# Patient Record
(demographics unavailable — no encounter records)

---

## 2020-07-27 NOTE — EMERGENCY DEPARTMENT NOTE
History of Present Illnes


History of Present Illness


Chief Complaint:  Extremity Trauma/Pain


History of Present Illness


This is a 9 year old  male with left hand and little finger pain

s/p falling onto an outstretched hand while playing basketball yesterday.


Historian:  Family Member


Arrival Mode:  Car


 Required:  No


Onset (how long ago):  day(s) (1)


Location:  left hand/little finger


Quality:  dull


Severity:  moderate


Onset quality:  sudden


Duration (how long):  day(s)


Timing of current episode:  constant


Progression:  unchanged


Chronicity:  new


Context:  Reports trauma/injury; 


   Denies recent illness, Denies recent travel


Relieving factors:  none


Exacerbating factors:  none


Associated symptoms:  Denies confusion, Denies fever/chills, Denies malaise, 

Denies nausea/vomiting, Denies shortness of breath





Past Medical/Family History


Physician Review


I have reviewed the patient's past medical and family history.  Any updates have

been documented here.





Past Medical History


Recent Fever:  No


Clinical Suspicion of Infectio:  No


New/Unexplained Change in Ment:  No


Past Medical History:  None


Past Surgical History:  None





Social History


Smoking Cessation:  Never Smoker


Alcohol Use:  None


Any Illegal Drug Use:  No


Physically hurt or threatened:  No





Other


Last Tetanus:  UTD


Any Pre-Existing Lines (PICC,:  No





Review of Systems


Review of Systems


Constitutional:  Reports no symptoms


EENTM:  Reports no symptoms


Cardiovascular:  Denies chest pain, Denies palpitations, Denies syncope


Respiratory:  Reports no symptoms


Gastrointestinal:  Reports no symptoms


Genitourinary:  Reports no symptoms


Musculoskeletal:  Reports as per HPI


Integumentary:  Reports no symptoms


Neurological:  Reports no symptoms


Psychological:  Reports no symptoms


Review of other systems:  All other systems negative





Physical Exam


Related Data


Allergies:  


Coded Allergies:  


     No Known Allergies (Unverified , 2/10/18)


Triage Vital Signs





Vital Signs








  Date Time  Temp Pulse Resp B/P (MAP) Pulse Ox O2 Delivery O2 Flow Rate FiO2


 


7/27/20 14:43 98.3 84 18 99/65 99 Room Air  











Physical Exam


CONSTITUTIONAL





Constitutional:  Present well-developed, Present well-nourished


HENT


HENT:  Present normocephalic, Present atraumatic


HENT L/R:  Present left ext ear normal, Present right ext ear normal


EYES





Eyes:  Reports conjunctivae normal; 


   Denies left eye discharge, Denies right eye discharge


NECK


Neck:  Present ROM normal, Present supple


PULMONARY


Pulmonary:  Present effort normal, Present breath sounds normal; 


   Absent respiratory distress, Absent chest tenderness


CARDIOVASCULAR





Cardiovascular:  Present regular rhythm, Present capillary refill normal


GASTROINTESTINAL





Abdominal:  Present soft


GENITOURINARY





SKIN


Skin:  Present warm


MUSCULOSKELETAL





Musculoskeletal:  Present tenderness (left little finger tenderness over prox 

phaylynx and ulnar aspect of hand.), Present other (FROM of left little finger 

DIP joint with no pain. FROM of left little finger PIP joint and MP joint, but 

with pain.); 


   Absent edema, Absent deformity


NEUROLOGICAL





Neurological:  Present alert, Present oriented x 3, Present no gross motor or 

sensory deficits; 


   Absent sensory deficit


PSYCHOLOGICAL


Psychological:  Present mood/affect normal, Present behavior normal





Results


Imaging


Imaging Comments


EXAMINATION:  HAND 3 VIEW LT - HOPD    





INDICATION: Trauma





COMPARISON: None


     


FINDINGS:





Acute buckle fracture of the medial metaphysis and minimally displaced Salter 2


fracture of the lateral metaphysis of the fifth proximal phalanx with


associated fifth digit soft tissue swelling. 





IMPRESSION: 


Acute base of fifth proximal phalanx fractures as above with associated


overlying soft tissue swelling.





Signed by: Kortney Patel MD on 7/27/2020 3:55 PM





Procedures


Orthopedic Splinting/Casting


Injury:  Injury #1


Side:  left


Upper exremity injury location:  hand


Upper extremity immobilizer:  ulnar gutter


Additional comments


N/V intact after splint placed.





Assessment & Plan


Medical Decision Making


MDM


sprain, strain, fracture





Reassessment


Reassessment time:  16:42


Reassessment


Ulnar gutter in place. N/V intact. No pain.





Assessment & Plan


Final Impression:  


(1) Phalanx, proximal fracture of finger


Last Vital Signs











  Date Time  Temp Pulse Resp B/P (MAP) Pulse Ox O2 Delivery O2 Flow Rate FiO2


 


7/27/20 14:43 98.3 84 18 99/65 99 Room Air  








Home Meds


Reported Medications


Albuterol Sulfate (ALBUTEROL SULFATE) 0.63 Mg/3 Ml Vial.neb, INH PRN for 

SHORTNESS OF BREATH


   2/10/18











IRENE ABBOTT MD              Jul 27, 2020 15:50

## 2020-07-27 NOTE — DIAGNOSTIC IMAGING REPORT
EXAMINATION:  HAND 3 VIEW LT - HOPD    



INDICATION: Trauma



COMPARISON: None

     

FINDINGS:



Acute buckle fracture of the medial metaphysis and minimally displaced Salter 2

fracture of the lateral metaphysis of the fifth proximal phalanx with

associated fifth digit soft tissue swelling. 



IMPRESSION: 

Acute base of fifth proximal phalanx fractures as above with associated

overlying soft tissue swelling.



Signed by: Kortney Patel MD on 7/27/2020 3:55 PM

## 2020-10-04 NOTE — XMS REPORT
Continuity of Care Document

                             Created on: 10/04/2020



ARTUR LOPEZ

External Reference #: [id number]

: 2011

Sex: Male



Demographics





                          Address                   [address]

[place]  [address]

 

                          Home Phone                [phone]

 

                          Preferred Language        English

 

                          Marital Status            Unknown

 

                          Yarsani Affiliation     Unknown

 

                          Race                      Unknown

 

                                        Additional Race(s) 

Afro-American



 

                          Ethnic Group              Unknown





Author





                          Author                    [facility]

t

 

                          Organization              [facility]

t

 

                          Address                   [address]

[place]  [address]



 

                          Phone                     Unavailable







Support





                Name            Relationship    Address         Phone

 

                    LUIS ALBERTO WATKINS     ECON                [address]

[place]  [address]                     Unavailable







Care Team Providers





                    Care Team Member Name Role                Phone

 

                    NONSTAFF            PCP                 Unavailable

 

                    LUIS ALBERTO CID M.D. Attphys             Unavailable

 

                    MONTANA ABBOTT             Unavailable







Payers





           Payer Name Policy Type Policy Number Effective Date Expiration Date [facility]            [id number]  2018-10-01 00:00:00 202

 00:00:00 [facility]







Problems





           Condition Name Condition Details Condition Category Status     Onset 

Date Resolution

Date            Last Treatment Date Treating Clinician Comments        Source

 

                                        Closed displaced fracture of proximal ph

alanx of left little finger, initial 

encounter                               Closed displaced fracture of proximal ph

alanx of left little finger, 

initial encounter Problem Active                                          [facility]

 

       Fracture of proximal phalanx of finger        Problem Active             

                       [facility]







Allergies, Adverse Reactions, Alerts

This patient has no known allergies or adverse reactions.



Social History





           Social Habit Start Date Stop Date  Quantity   Comments   Source

 

           Sex Assigned At Birth 2011 00:00:00 2011 00:00:00 Male   

               [facility]







Medications





             Ordered Medication Name Filled Medication Name Start Date   Stop Da

te    Current 

Medication? Ordering Clinician Indication Dosage     Frequency  Signature (SIG) 

Comments                  Components                Source

 

       Albuterol Sulfate Albuterol Sulfate               Yes                    

            as needed for Shortness Of 

Breath                                                      [facility]







Vital Signs





             Vital Name   Observation Time Observation Value Comments     Source

 

             Body Temperature 2020 17:00:00 98.6 [degF]               [facility]

 

             Weight       2020 14:43:00 131 [lb_av]               [facility]

 

             BMI (Body Mass Index) 2020 14:43:00 28.3 kg/m2               

 [facility]







Procedures

This patient has no known procedures.



Plan of Care





             Planned Activity Planned Date Details      Comments     Source

 

             Instructions              Fractures                 [facility]







Encounters





             Start Date/Time End Date/Time Encounter Type Admission Type Attendi

[facility]   Care Department Encounter ID    Source

 

             2020 13:00:00 2020 13:00:00 Appointment; CLAUDIA CID M.D. TRAVER, JESSICA, M.D. [facility]             Orthopedics - [place] 1 [id number]       

 [facility]

 

             2020 10:15:00 2020 10:15:00 Appointment; CLAUDIA CID M.D. TRAVER, JESSICA, M.D. [facility] 1 [id number]       

 [facility]

 

           2020 14:55:00 2020 17:21:00 Departed Emergency Room 1    

      IRENE ABBOTT 

[facility] [id number]        [facility]

 

          2018-08-10 02:58:00 2018-08-10 03:47:00 Departed Emergency Room       

              [facility]                    [id number]              [facility]

 

          2018-02-10 22:31:00 2018 00:00:00 Departed Emergency Room       

              [facility]                    [id number]              [facility]







Results





           Test Description Test Time  Test Comments Results    Result Comments 

Source

 

                [U] XRAY FINGER(S) - 2 VWS MIN. LEFT [id number] 2020 13:43:00  

               Images 

acquired, not reported on this accession number.                           [facility] 

[facility]

 

                HAND 3 VIEW LT - HOPD 2020 15:51:00                       

                              

                                                  [place] [address]      Patient Name: ARTUR LOPEZ                                MR #: 
[id number]                  : 2011                                   
Age/Sex: 9/M  Acct #: [id number]                              Req #: [id number]
[id number]  Adm Physician:                                                      
Ordered by: IRENE ABBOTT MD                         Report #: [id number]      
  Location: [facility]                                    Room/Bed:                   
________________________________________________________________________________

___________________    Procedure: [id number] HOPD/HAND 3 VIEW LT - HOPD  Exam 
Date: 20                            Exam Time: 1531                       
                       REPORT STATUS: Signed    EXAMINATION:  HAND 3 VIEW LT - 
HOPD          INDICATION: Trauma      COMPARISON: None           FINDINGS:      
Acute buckle fracture of the medial metaphysis and minimally displaced Salter 2 
  fracture of the lateral metaphysis of the fifth proximal phalanx with   asso
ciated fifth digit soft tissue swelling.       IMPRESSION:    Acute base of 
fifth proximal phalanx fractures as above with associated   overlying soft 
tissue swelling.      Signed by: Eddie Sanchez MD on 2020 3:55 PM        
Dictated By: EDDIE SANCHEZ MD  Electronically Signed By: EDDIE SANCHEZ MD on 20  Transcribed By: KIRBY on 20       COPY TO:   IRENE ABBOTT MD

## 2020-10-04 NOTE — EMERGENCY DEPARTMENT NOTE
History of Present Illnes


History of Present Illness


Chief Complaint:  Pediatric Illness


History of Present Illness


This is a 9 year old  male with chief complaint of fever since 

October 1, 2020. Patient initial complaint was a sore throat, a swollen "gland" 

on his left neck, and a generalized rash. He saw his pediatrician the day after 

the onset of his illness where he had a negative strep and negative flu test. He

was started on Augmentin and Hydroxyzine. The rash has resolved and the "gland" 

is decreasing in size. Mom presents today for fever that did not resolve with 

7mg/kg of APAP. He's had an occasional nonproductive cough. No SOB. No wheezing.

No dysuria or hematuria. He's had 4 episodes of vomiting. There's been no 

hematemesis. He's had diarrhea approximately once a day which she describes as w

atery. No loss of taste or smell. No sick contacts. No bad food. Patient with 

PMHx of dextrocardia and "seasonal" Asthma. Patient only has Asthma attacks in 

the fall "when the weather changes". No prior hospital admits for asthma. Has 

home neb treatments, but has not used them "in a long time".


Historian:  Patient, Family Member


Arrival Mode:  Car


 Required:  No


Onset (how long ago):  day(s) (Oct. 1, 2020)


Location:  Sore throat and swollen gland on rt


Quality:  "pain"


Radiation:  Reports non-radiation


Severity:  unable to specify


Onset quality:  unable to specify


Duration (how long):  day(s)


Timing of current episode:  constant


Progression:  improving


Chronicity:  new


Context:  Reports recent illness; 


   Denies trauma/injury


Relieving factors:  none


Exacerbating factors:  none


Associated symptoms:  Reports fever/chills, Reports loss of appetite, Reports 

malaise, Reports nausea/vomiting, Reports rash; 


   Denies confusion, Denies chest pain, Denies diaphoresis, Denies headaches, 

Denies seizure, Denies shortness of breath


Treatments prior to arrival:  antipyretic, other (antibiotic)


Previous service:  tests performed





Past Medical/Family History


Physician Review


I have reviewed the patient's past medical and family history.  Any updates have

been documented here.





Past Medical History


Recent Fever:  Yes


Clinical Suspicion of Infectio:  Yes


New/Unexplained Change in Ment:  No


Other Medical History:  


Asthma, Seasonal (once a year in the fall)


Past Surgical History:  None





Social History


Smoking Cessation:  Never Smoker


Alcohol Use:  None


Any Illegal Drug Use:  No





Other


Last Tetanus:  UTD





Review of Systems


Review of Systems


Constitutional:  Reports chills, Reports fever, Reports malaise; 


   Denies diaphoresis, Denies weakness


EENTM:  Reports throat pain; 


   Denies ear discharge, Denies nose congestion, Denies throat swelling, Denies 

mouth pain


Cardiovascular:  Denies chest pain, Denies palpitations


Respiratory:  Reports as per HPI


Gastrointestinal:  Reports as per HPI


Genitourinary:  Denies dysuria, Denies hematuria


Musculoskeletal:  Denies joint swelling, Denies muscle stiffness, Denies neck 

pain


Integumentary:  Reports as per HPI


Neurological:  Denies headache, Denies numbness, Denies paresthesia


Endocrine:  Denies increased thirst, Denies increased urination


Hematological/Lymphatic:  Denies easy bruising





Physical Exam


Related Data


Allergies:  


Coded Allergies:  


     No Known Allergies (Unverified , 2/10/18)





Physical Exam


CONSTITUTIONAL





Constitutional:  Present well-developed, Present well-nourished


HENT


HENT:  Present normocephalic, Present atraumatic, Present oropharynx 

clear/moist, Present nose normal


HENT L/R:  Present left ext ear normal, Present right ext ear normal


EYES





Eyes:  Reports PERRL, Reports conjunctivae normal


NECK


Neck:  Present ROM normal, Present cervical adenopathy (left)


PULMONARY


Pulmonary:  Present effort normal, Present breath sounds normal


CARDIOVASCULAR





Cardiovascular:  Present regular rhythm, Present heart sounds normal, Present 

capillary refill normal, Present normal rate


GASTROINTESTINAL





Abdominal:  Present soft, Present nontender, Present bowel sounds normal


GENITOURINARY





SKIN


Skin:  Present warm, Present dry


MUSCULOSKELETAL





Musculoskeletal:  Present ROM normal


NEUROLOGICAL





Neurological:  Present alert, Present oriented x 3, Present no gross motor or 

sensory deficits


PSYCHOLOGICAL


Psychological:  Present mood/affect normal, Present judgement normal





Assessment & Plan


Medical Decision Making


MDM


Spoke with mom at length about testing options of swabs, blood, CXR, and urine 

which were offered to mom and she declined. Strep neg at PCP and on Augmentin 

which should cover Strep. Flu negative @ PCP and now outside window of Tamiflu. 

UA: no urinary complaints. CXR: occasional mild non productive cough. COVID, no 

rapid testing available. COVID testing here to take 3 to 5 days and will not 

change today's treatment. Patient with no meningeal signs. Mom states that the 

reason for her visit was for the fever. Explained proper dosing to mom and 

ability to use both APAP and NSAID. Patient has appointment with PCP in AM who 

will re-evaluate at that time.





Assessment & Plan


Final Impression:  


(1) Viral illness


(2) Upper respiratory infection, acute


Depart Disposition:  HOME, SELF-CARE


Home Meds


Active Scripts


Albuterol Sulfate (PROVENTIL HFA) 6.7 Gm Hfa.aer.ad, 2 INH INH Q6HR for 

SHORTNESS OF BREATH, #1 INH


   Prov:IREEN ABBOTT MD         10/4/20


Reported Medications


Albuterol Sulfate (ALBUTEROL SULFATE) 0.63 Mg/3 Ml Vial.neb, INH PRN for 

SHORTNESS OF BREATH


   2/10/18


Medications in the ED





Ibuprofen 500 mg STK-MED ONCE .ROUTE ;  Start 10/4/20 at 09:57;  Stop 10/4/20 at

09:52;  Status DC











IRENE ABBOTT MD               Oct 4, 2020 10:27